# Patient Record
Sex: FEMALE
[De-identification: names, ages, dates, MRNs, and addresses within clinical notes are randomized per-mention and may not be internally consistent; named-entity substitution may affect disease eponyms.]

---

## 2019-08-23 ENCOUNTER — HOSPITAL ENCOUNTER (OUTPATIENT)
Dept: HOSPITAL 88 - ER | Age: 59
Setting detail: OBSERVATION
LOS: 1 days | Discharge: HOME | End: 2019-08-24
Attending: INTERNAL MEDICINE | Admitting: INTERNAL MEDICINE
Payer: MEDICARE

## 2019-08-23 VITALS — WEIGHT: 142 LBS | BODY MASS INDEX: 26.81 KG/M2 | HEIGHT: 61 IN

## 2019-08-23 VITALS — SYSTOLIC BLOOD PRESSURE: 104 MMHG | DIASTOLIC BLOOD PRESSURE: 54 MMHG

## 2019-08-23 VITALS — SYSTOLIC BLOOD PRESSURE: 101 MMHG | DIASTOLIC BLOOD PRESSURE: 52 MMHG

## 2019-08-23 VITALS — DIASTOLIC BLOOD PRESSURE: 52 MMHG | SYSTOLIC BLOOD PRESSURE: 101 MMHG

## 2019-08-23 DIAGNOSIS — D50.0: Primary | ICD-10-CM

## 2019-08-23 DIAGNOSIS — E78.5: ICD-10-CM

## 2019-08-23 DIAGNOSIS — E88.09: ICD-10-CM

## 2019-08-23 DIAGNOSIS — Z79.84: ICD-10-CM

## 2019-08-23 DIAGNOSIS — Z90.13: ICD-10-CM

## 2019-08-23 DIAGNOSIS — E11.9: ICD-10-CM

## 2019-08-23 DIAGNOSIS — Z83.3: ICD-10-CM

## 2019-08-23 DIAGNOSIS — R53.1: ICD-10-CM

## 2019-08-23 DIAGNOSIS — Z85.05: ICD-10-CM

## 2019-08-23 DIAGNOSIS — Z85.3: ICD-10-CM

## 2019-08-23 DIAGNOSIS — E77.8: ICD-10-CM

## 2019-08-23 DIAGNOSIS — N28.9: ICD-10-CM

## 2019-08-23 DIAGNOSIS — K21.9: ICD-10-CM

## 2019-08-23 DIAGNOSIS — Z90.49: ICD-10-CM

## 2019-08-23 DIAGNOSIS — Z82.49: ICD-10-CM

## 2019-08-23 DIAGNOSIS — F32.9: ICD-10-CM

## 2019-08-23 DIAGNOSIS — R77.1: ICD-10-CM

## 2019-08-23 LAB
ALBUMIN SERPL-MCNC: 3.1 G/DL (ref 3.5–5)
ALBUMIN/GLOB SERPL: 1.5 {RATIO} (ref 0.8–2)
ALP SERPL-CCNC: 24 IU/L (ref 40–150)
ALT SERPL-CCNC: 6 IU/L (ref 0–55)
ANION GAP SERPL CALC-SCNC: 9.9 MMOL/L (ref 8–16)
BASOPHILS # BLD AUTO: 0 10*3/UL (ref 0–0.1)
BASOPHILS NFR BLD AUTO: 0.2 % (ref 0–1)
BUN SERPL-MCNC: 8 MG/DL (ref 7–26)
BUN/CREAT SERPL: 8 (ref 6–25)
CALCIUM SERPL-MCNC: 9.1 MG/DL (ref 8.4–10.2)
CHLORIDE SERPL-SCNC: 109 MMOL/L (ref 98–107)
CK MB SERPL-MCNC: 0.9 NG/ML (ref 0–5)
CK SERPL-CCNC: 34 IU/L (ref 29–168)
CO2 SERPL-SCNC: 22 MMOL/L (ref 22–29)
DEPRECATED APTT PLAS QN: 28.8 SECONDS (ref 23.8–35.5)
DEPRECATED INR PLAS: 1.11
DEPRECATED NEUTROPHILS # BLD AUTO: 4.3 10*3/UL (ref 2.1–6.9)
EGFRCR SERPLBLD CKD-EPI 2021: 53 ML/MIN (ref 60–?)
EOSINOPHIL # BLD AUTO: 0.1 10*3/UL (ref 0–0.4)
EOSINOPHIL NFR BLD AUTO: 1.3 % (ref 0–6)
ERYTHROCYTE [DISTWIDTH] IN CORD BLOOD: 18.4 % (ref 11.7–14.4)
GLOBULIN PLAS-MCNC: 2.1 G/DL (ref 2.3–3.5)
GLUCOSE SERPLBLD-MCNC: 100 MG/DL (ref 74–118)
HCT VFR BLD AUTO: 17.2 % (ref 34.2–44.1)
HGB BLD-MCNC: 4.8 G/DL (ref 12–16)
LYMPHOCYTES # BLD: 0.6 10*3/UL (ref 1–3.2)
LYMPHOCYTES NFR BLD AUTO: 11.2 % (ref 18–39.1)
MCH RBC QN AUTO: 28.7 PG (ref 28–32)
MCHC RBC AUTO-ENTMCNC: 27.9 G/DL (ref 31–35)
MCV RBC AUTO: 103 FL (ref 81–99)
MONOCYTES # BLD AUTO: 0.4 10*3/UL (ref 0.2–0.8)
MONOCYTES NFR BLD AUTO: 7.3 % (ref 4.4–11.3)
NEUTS SEG NFR BLD AUTO: 79.4 % (ref 38.7–80)
PLATELET # BLD AUTO: 331 X10E3/UL (ref 140–360)
POTASSIUM SERPL-SCNC: 3.9 MMOL/L (ref 3.5–5.1)
PROTHROMBIN TIME: 14.8 SECONDS (ref 11.9–14.5)
RBC # BLD AUTO: 1.67 X10E6/UL (ref 3.6–5.1)
SODIUM SERPL-SCNC: 137 MMOL/L (ref 136–145)

## 2019-08-23 PROCEDURE — 82550 ASSAY OF CK (CPK): CPT

## 2019-08-23 PROCEDURE — 80053 COMPREHEN METABOLIC PANEL: CPT

## 2019-08-23 PROCEDURE — 36415 COLL VENOUS BLD VENIPUNCTURE: CPT

## 2019-08-23 PROCEDURE — 86900 BLOOD TYPING SEROLOGIC ABO: CPT

## 2019-08-23 PROCEDURE — 84484 ASSAY OF TROPONIN QUANT: CPT

## 2019-08-23 PROCEDURE — 82553 CREATINE MB FRACTION: CPT

## 2019-08-23 PROCEDURE — 99284 EMERGENCY DEPT VISIT MOD MDM: CPT

## 2019-08-23 PROCEDURE — 85025 COMPLETE CBC W/AUTO DIFF WBC: CPT

## 2019-08-23 PROCEDURE — 36430 TRANSFUSION BLD/BLD COMPNT: CPT

## 2019-08-23 PROCEDURE — 82728 ASSAY OF FERRITIN: CPT

## 2019-08-23 PROCEDURE — 85045 AUTOMATED RETICULOCYTE COUNT: CPT

## 2019-08-23 PROCEDURE — 85610 PROTHROMBIN TIME: CPT

## 2019-08-23 PROCEDURE — 82607 VITAMIN B-12: CPT

## 2019-08-23 PROCEDURE — 86920 COMPATIBILITY TEST SPIN: CPT

## 2019-08-23 PROCEDURE — 85730 THROMBOPLASTIN TIME PARTIAL: CPT

## 2019-08-23 PROCEDURE — 82948 REAGENT STRIP/BLOOD GLUCOSE: CPT

## 2019-08-23 PROCEDURE — 86850 RBC ANTIBODY SCREEN: CPT

## 2019-08-23 PROCEDURE — 82784 ASSAY IGA/IGD/IGG/IGM EACH: CPT

## 2019-08-23 RX ADMIN — INSULIN HUMAN SCH UNIT: 100 INJECTION, SOLUTION PARENTERAL at 21:00

## 2019-08-23 RX ADMIN — FUROSEMIDE PRN MG: 10 INJECTION, SOLUTION INTRAMUSCULAR; INTRAVENOUS at 23:14

## 2019-08-23 NOTE — NUR
received patient aaox3, poor historian, receiving blood transfusion, tolerating well. no 
sob. denies pain. bed locked and in lowest position, call light within reach. bed alarm on.

## 2019-08-23 NOTE — XMS REPORT
Patient Summary Document

                             Created on: 2019



BETTYE VALLADARES

External Reference #: [id number]

: 1960

Sex: Female



Demographics







                          Address                   [address]

[place]  [address]

 

                          Home Phone                [phone]

 

                          Preferred Language        Unknown

 

                          Marital Status            Unknown

 

                          Taoism Affiliation     Unknown

 

                          Race                      Unknown

 

                          Ethnic Group              Unknown





Author







                          Author                    [facility]

 

                          [facility]

 

                          Address                   Unknown

 

                          Phone                     Unavailable







Support







                Name            Relationship    Address         Phone

 

                    GUADALUPE KELLER      PRS                 [id number]

[place]  [address]                      [phone]

 

                    AGUIRREMAGNUS RED    PRS                 [id number]

[place]  [address]                      [phone]







Care Team Providers







                    Care Team Member Name    Role                Phone

 

                          Unavailable               Unavailable







Payers







             Payer Name    Policy Type    Policy Number    Effective Date    Expiration Date







Problems

This patient has no known problems.



Allergies, Adverse Reactions, Alerts







          Allergy Name    Allergy Type    Status    Severity    Reaction(s)    Onset Date    Inactive 

Date                      Treating Clinician        Comments

 

        No Known Allergies    DA      Active    U               2019 00:00:00                     

 

        No Known Allergies    DA      Active    U               2017 00:00:00                     







Medications

This patient has no known medications.



Results







           Test Description    Test Time    Test Comments    Text Results    Atomic Results    Result

 Comments

 

                ELECTROLYTES PROFILE    2019 16:18:00                      

 

   

 

                SODIUM (test code=NA)    138 mmol/L      136-145          

 

                POTASSIUM (test code=K)    3.9 mmol/L      3.5-5.1          

 

                CHLORIDE (test code=CL)    106.0 mmol/L               

 

                CARBON DIOXIDE (test code=CO2)    26.0 mmol/L     21-32            

 

                ANION GAP (test code=GAP)    9.9             10-20            





CBC W/AUTO [id number] 16:12:00* 





                Test Item       Value           Reference Range    Comments

 

                WHITE BLOOD CELL (test code=WBC)    5.5 K/mm3       4.5-12.5         

 

                RED BLOOD CELL (test code=RBC)    4.16 mill/mm3    3.7-5.2          

 

                HEMOGLOBIN (test code=HGB)    11.1 gram/dL    11.5-15.5        

 

                HEMATOCRIT (test code=HCT)    36.3 %          36.0-46.0        

 

                MEAN CELL VOLUME (test code=MCV)    87.3 fL         80-98            

 

                MEAN CELL HGB (test code=MCH)    26.7 picogram    27.0-33.0        

 

                MEAN CELL HGB CONCETRATION (test code=MCHC)    30.6 gram/dL    33.0-36.0        

 

                RED CELL DISTRIBUTION WIDTH (test code=RDW)    14.5 %          11.6-16.2        

 

                RED CELL DISTRIBUTION WIDTH SD (test code=RDW-SD)    46.5 fL         37.0-51.0        

 

                PLATELET COUNT (test code=PLT)    290 K/mm3       150-450          

 

                MEAN PLATELET VOLUME (test code=MPV)    10.8 fL         6.7-11.0         

 

                NEUTROPHIL % (test code=NT%)    68.2 %          39.0-69.0        

 

                IMMATURE GRANULOCYTE % (test code=IG%)    0.4 %           0.0-5.0          

 

                LYMPHOCYTE % (test code=LY%)    19.4 %          25.0-55.0        

 

                MONOCYTE % (test code=MO%)    7.5 %           0.0-10.0         

 

                EOSINOPHIL % (test code=EO%)    4.0 %           0.0-5.0          

 

                BASOPHIL % (test code=BA%)    0.5 %           0.0-1.0          

 

                NUCLEATED RBC % (test code=NRBC%)    0.0 %           0-0              

 

                NEUTROPHIL # (test code=NT#)    3.72 K/mm3      1.8-7.7          

 

                IMMATURE GRANULOCYTE # (test code=IG#)    0.02 x10 3/uL    0-0.03           

 

                LYMPHOCYTE # (test code=LY#)    1.06 K/mm3      1.0-5.0          

 

                MONOCYTE # (test code=MO#)    0.41 K/mm3      0-0.8            

 

                EOSINOPHIL # (test code=EO#)    0.22 K/mm3      0.0-0.5          

 

                BASOPHIL # (test code=BA#)    0.03 K/mm3      0.0-0.2          

 

                NUCLEATED RBC # (test code=NRBC#)    0.00 K/mm3      0.0-0.1

## 2019-08-23 NOTE — NUR
Dr. Rose rounded. Verbal orders received to give only 2 units of blood. lab orders 
entered. spoke with  stated immunoglobin labs are send outs, will draw in AM. 
questioned lab about cancelling 2 units of blood per MD orders, stated its an open order 
now, just  2nd unit. placed notify by md order to only give 2 units of blood.

## 2019-08-24 VITALS — DIASTOLIC BLOOD PRESSURE: 57 MMHG | SYSTOLIC BLOOD PRESSURE: 109 MMHG

## 2019-08-24 VITALS — SYSTOLIC BLOOD PRESSURE: 103 MMHG | DIASTOLIC BLOOD PRESSURE: 56 MMHG

## 2019-08-24 VITALS — SYSTOLIC BLOOD PRESSURE: 101 MMHG | DIASTOLIC BLOOD PRESSURE: 65 MMHG

## 2019-08-24 VITALS — DIASTOLIC BLOOD PRESSURE: 59 MMHG | SYSTOLIC BLOOD PRESSURE: 102 MMHG

## 2019-08-24 VITALS — SYSTOLIC BLOOD PRESSURE: 109 MMHG | DIASTOLIC BLOOD PRESSURE: 60 MMHG

## 2019-08-24 LAB
BASOPHILS # BLD AUTO: 0.1 10*3/UL (ref 0–0.1)
BASOPHILS NFR BLD AUTO: 1.1 % (ref 0–1)
DEPRECATED NEUTROPHILS # BLD AUTO: 3.5 10*3/UL (ref 2.1–6.9)
EOSINOPHIL # BLD AUTO: 0.1 10*3/UL (ref 0–0.4)
EOSINOPHIL NFR BLD AUTO: 2.5 % (ref 0–6)
ERYTHROCYTE [DISTWIDTH] IN CORD BLOOD: 19.4 % (ref 11.7–14.4)
HCT VFR BLD AUTO: 24.5 % (ref 34.2–44.1)
HGB BLD-MCNC: 7.4 G/DL (ref 12–16)
LYMPHOCYTES # BLD: 0.7 10*3/UL (ref 1–3.2)
LYMPHOCYTES NFR BLD AUTO: 14.6 % (ref 18–39.1)
MCH RBC QN AUTO: 28.7 PG (ref 28–32)
MCHC RBC AUTO-ENTMCNC: 30.2 G/DL (ref 31–35)
MCV RBC AUTO: 95 FL (ref 81–99)
MONOCYTES # BLD AUTO: 0.3 10*3/UL (ref 0.2–0.8)
MONOCYTES NFR BLD AUTO: 6.8 % (ref 4.4–11.3)
NEUTS SEG NFR BLD AUTO: 74.6 % (ref 38.7–80)
PLATELET # BLD AUTO: 335 X10E3/UL (ref 140–360)
RBC # BLD AUTO: 2.58 X10E6/UL (ref 3.6–5.1)

## 2019-08-24 RX ADMIN — INSULIN HUMAN SCH UNIT: 100 INJECTION, SOLUTION PARENTERAL at 16:05

## 2019-08-24 RX ADMIN — INSULIN HUMAN SCH UNIT: 100 INJECTION, SOLUTION PARENTERAL at 07:30

## 2019-08-24 RX ADMIN — FUROSEMIDE PRN MG: 10 INJECTION, SOLUTION INTRAMUSCULAR; INTRAVENOUS at 06:24

## 2019-08-24 RX ADMIN — INSULIN HUMAN SCH UNIT: 100 INJECTION, SOLUTION PARENTERAL at 11:30

## 2019-08-24 NOTE — NUR
2ND UNIT OF BLOOD TRANSFUSING. REMAINED WITH PATIENT FOR FIRST 15 MIN. VSS. WILL CONTINUE TO 
MONITOR PATIENT. TOLERATING TRANSFUSION.

## 2019-08-24 NOTE — NUR
Dr Kennedy consulted per telephone order from Dr Bowers. PRBC transfusion started and no 
reaction after 1st 15 min. will monitor closely.

## 2019-08-24 NOTE — NUR
BLOOD TRANSFUSION COMPLETE AT THIS TIME. NO S/S OF ADVERSE REACTION. PATIENT IN STABLE 
CONDITION, VSS. WILL CONTINUE TO MONITOR.

## 2019-08-24 NOTE — HISTORY AND PHYSICAL
Consultation to Dr. Bonilla Bowers.

 

HISTORY OF PRESENT ILLNESS:  Wen Mcclellan is a 59-year-old  female, who is

known to me for approximately 30 years for breast cancer with liver metastases, only one

of those patients, who have survived this long with confirmed from liver metastases. 

 

FAMILY HISTORY:  Noncontributory.

 

FAMILY HISTORY:  Noncontributory.

 

ALLERGIES:  REPORTED NONE.

 

MEDICATIONS:  At the present time:

1. Lasix.

2. Regular insulin. 

The patient had presented with weakness.

 

REVIEW OF SYSTEMS:

HEENT:  Normal. 

CARDIAC:  Normal. 

RESPIRATORY:  Normal. 

GI:  Normal. 

:  Normal. 

MUSCULOSKELETAL:  Normal. 

SKIN:  Bilateral mastectomy with liver metastases. 

BREASTS:  Bilateral mastectomy with liver metastases.

 

PHYSICAL EXAMINATION:

GENERAL:  A remarkable well-built female, very anemic. 

NECK:  No palpable adenopathy. 

HEART:  Within normal limits. 

LUNGS:  Clear. 

BREASTS:  Shows only chest wall, no masses. 

ABDOMEN:  Obese. 

RECTAL:  Deferred. 

VAGINAL:  Deferred. 

CENTRAL NERVOUS SYSTEM:  Essentially normal. 

EXTREMITIES:  Essentially normal.

LABORATORY DATA:  Shows a hemoglobin of 12.8, hematocrit 17.2, MCV high at 103, MCHC

very low at 27.9, RDW high at 18.4, white count of 5450, and platelets are reported at

331,000.  The patient's chemistry shows a sodium of 137, potassium 3.9, chloride 109,

CO2 22, BUN 8, and creatinine 1.06.  Total proteins low at 5.2, albumin low at 3.1,

globulin is low at 2.1, and B12 low at 229.  The ferritin level is not available at this

time for review.  Quantitation of immunoglobulins have been done. 

 

IMPRESSION:  

1. Breast cancer, status post bilateral mastectomy.

2. Liver metastases, in complete remission.

3. Combined deficiency anemia.

4. Hypoproteinemia.

5. Hypoalbuminemia.

6. Hyperglobulinemia.

7. 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

 

PLAN, COMMENTS, AND SUGGESTIONS:  Suggest blood, suggest B12, suggest an EGD and

colonoscopy as part of workup. 

 

The patient's stool for occult blood was suggested, however, the results are not

available at this time.  The patient's retic response is high at 11.7%, however, does

not have antibodies, so this is not hemolysis. 

 

I will confine myself to Hematology. 

 

Thank you very much for allowing me to participate in the management of this patient.

 

 

 

 

______________________________

MD NEVILLE Cano/BECKY

D:  08/24/2019 13:14:44

T:  08/24/2019 14:50:09

Job #:  [id number]

 

cc:            MD Noah Villa MD

## 2019-10-08 ENCOUNTER — HOSPITAL ENCOUNTER (OUTPATIENT)
Dept: HOSPITAL 88 - OR | Age: 59
Discharge: HOME | End: 2019-10-08
Attending: INTERNAL MEDICINE
Payer: MEDICARE

## 2019-10-08 VITALS — DIASTOLIC BLOOD PRESSURE: 85 MMHG | SYSTOLIC BLOOD PRESSURE: 131 MMHG

## 2019-10-08 DIAGNOSIS — Z79.84: ICD-10-CM

## 2019-10-08 DIAGNOSIS — K44.9: ICD-10-CM

## 2019-10-08 DIAGNOSIS — Z85.3: ICD-10-CM

## 2019-10-08 DIAGNOSIS — K21.9: ICD-10-CM

## 2019-10-08 DIAGNOSIS — R63.4: ICD-10-CM

## 2019-10-08 DIAGNOSIS — F32.9: ICD-10-CM

## 2019-10-08 DIAGNOSIS — E78.5: ICD-10-CM

## 2019-10-08 DIAGNOSIS — K29.70: Primary | ICD-10-CM

## 2019-10-08 DIAGNOSIS — D12.3: ICD-10-CM

## 2019-10-08 DIAGNOSIS — D12.0: ICD-10-CM

## 2019-10-08 DIAGNOSIS — E11.9: ICD-10-CM

## 2019-10-08 DIAGNOSIS — K31.7: ICD-10-CM

## 2019-10-08 DIAGNOSIS — F20.9: ICD-10-CM

## 2019-10-08 DIAGNOSIS — D12.4: ICD-10-CM

## 2019-10-08 DIAGNOSIS — D12.2: ICD-10-CM

## 2019-10-08 DIAGNOSIS — D12.9: ICD-10-CM

## 2019-10-08 DIAGNOSIS — Z79.82: ICD-10-CM

## 2019-10-08 DIAGNOSIS — Z80.0: ICD-10-CM

## 2019-10-08 DIAGNOSIS — D64.9: ICD-10-CM

## 2019-10-08 PROCEDURE — 45378 DIAGNOSTIC COLONOSCOPY: CPT

## 2019-10-08 PROCEDURE — 88305 TISSUE EXAM BY PATHOLOGIST: CPT

## 2019-10-08 PROCEDURE — 43239 EGD BIOPSY SINGLE/MULTIPLE: CPT

## 2019-10-08 PROCEDURE — 45385 COLONOSCOPY W/LESION REMOVAL: CPT

## 2019-10-08 PROCEDURE — 45384 COLONOSCOPY W/LESION REMOVAL: CPT

## 2019-10-08 PROCEDURE — 93005 ELECTROCARDIOGRAM TRACING: CPT

## 2019-10-08 PROCEDURE — 82948 REAGENT STRIP/BLOOD GLUCOSE: CPT

## 2019-10-08 PROCEDURE — 88312 SPECIAL STAINS GROUP 1: CPT

## 2019-10-08 PROCEDURE — 36415 COLL VENOUS BLD VENIPUNCTURE: CPT

## 2019-10-08 PROCEDURE — 43255 EGD CONTROL BLEEDING ANY: CPT

## 2019-10-08 PROCEDURE — 43236 UPPR GI SCOPE W/SUBMUC INJ: CPT

## 2019-10-08 PROCEDURE — 43251 EGD REMOVE LESION SNARE: CPT

## 2019-12-03 ENCOUNTER — HOSPITAL ENCOUNTER (OUTPATIENT)
Dept: HOSPITAL 88 - OR | Age: 59
Discharge: HOME | End: 2019-12-03
Attending: INTERNAL MEDICINE
Payer: MEDICARE

## 2019-12-03 VITALS — SYSTOLIC BLOOD PRESSURE: 155 MMHG | DIASTOLIC BLOOD PRESSURE: 88 MMHG

## 2019-12-03 DIAGNOSIS — K44.9: ICD-10-CM

## 2019-12-03 DIAGNOSIS — Z79.84: ICD-10-CM

## 2019-12-03 DIAGNOSIS — Z80.0: ICD-10-CM

## 2019-12-03 DIAGNOSIS — F20.9: ICD-10-CM

## 2019-12-03 DIAGNOSIS — K29.70: ICD-10-CM

## 2019-12-03 DIAGNOSIS — K31.7: Primary | ICD-10-CM

## 2019-12-03 DIAGNOSIS — D50.9: ICD-10-CM

## 2019-12-03 DIAGNOSIS — E11.9: ICD-10-CM

## 2019-12-03 DIAGNOSIS — F30.9: ICD-10-CM

## 2019-12-03 DIAGNOSIS — Z85.3: ICD-10-CM

## 2019-12-03 DIAGNOSIS — Z79.82: ICD-10-CM

## 2019-12-03 DIAGNOSIS — E78.5: ICD-10-CM

## 2019-12-03 DIAGNOSIS — R63.4: ICD-10-CM

## 2019-12-03 PROCEDURE — 82948 REAGENT STRIP/BLOOD GLUCOSE: CPT

## 2019-12-03 PROCEDURE — 43251 EGD REMOVE LESION SNARE: CPT

## 2019-12-03 PROCEDURE — 43236 UPPR GI SCOPE W/SUBMUC INJ: CPT

## 2019-12-03 PROCEDURE — 43255 EGD CONTROL BLEEDING ANY: CPT

## 2019-12-03 PROCEDURE — 88305 TISSUE EXAM BY PATHOLOGIST: CPT

## 2019-12-03 PROCEDURE — 36415 COLL VENOUS BLD VENIPUNCTURE: CPT

## 2019-12-03 PROCEDURE — 43235 EGD DIAGNOSTIC BRUSH WASH: CPT

## 2019-12-03 PROCEDURE — 88312 SPECIAL STAINS GROUP 1: CPT

## 2020-03-10 ENCOUNTER — HOSPITAL ENCOUNTER (OUTPATIENT)
Dept: HOSPITAL 88 - OR | Age: 60
Discharge: HOME | End: 2020-03-10
Attending: INTERNAL MEDICINE
Payer: MEDICARE

## 2020-03-10 VITALS — DIASTOLIC BLOOD PRESSURE: 76 MMHG | SYSTOLIC BLOOD PRESSURE: 121 MMHG

## 2020-03-10 DIAGNOSIS — F20.9: ICD-10-CM

## 2020-03-10 DIAGNOSIS — Z80.0: ICD-10-CM

## 2020-03-10 DIAGNOSIS — K57.30: ICD-10-CM

## 2020-03-10 DIAGNOSIS — F31.9: ICD-10-CM

## 2020-03-10 DIAGNOSIS — Z79.84: ICD-10-CM

## 2020-03-10 DIAGNOSIS — D50.9: ICD-10-CM

## 2020-03-10 DIAGNOSIS — D12.3: ICD-10-CM

## 2020-03-10 DIAGNOSIS — E11.9: ICD-10-CM

## 2020-03-10 DIAGNOSIS — K21.9: ICD-10-CM

## 2020-03-10 DIAGNOSIS — K44.9: ICD-10-CM

## 2020-03-10 DIAGNOSIS — Z01.810: ICD-10-CM

## 2020-03-10 DIAGNOSIS — K63.9: ICD-10-CM

## 2020-03-10 DIAGNOSIS — D12.4: ICD-10-CM

## 2020-03-10 DIAGNOSIS — K29.70: ICD-10-CM

## 2020-03-10 DIAGNOSIS — Z85.3: ICD-10-CM

## 2020-03-10 DIAGNOSIS — Z79.82: ICD-10-CM

## 2020-03-10 DIAGNOSIS — K64.8: ICD-10-CM

## 2020-03-10 DIAGNOSIS — K31.7: Primary | ICD-10-CM

## 2020-03-10 PROCEDURE — 93005 ELECTROCARDIOGRAM TRACING: CPT

## 2020-03-10 PROCEDURE — 43239 EGD BIOPSY SINGLE/MULTIPLE: CPT

## 2020-03-10 PROCEDURE — 45385 COLONOSCOPY W/LESION REMOVAL: CPT

## 2020-03-10 PROCEDURE — 45380 COLONOSCOPY AND BIOPSY: CPT

## 2020-03-10 PROCEDURE — 88305 TISSUE EXAM BY PATHOLOGIST: CPT

## 2020-03-10 PROCEDURE — 82948 REAGENT STRIP/BLOOD GLUCOSE: CPT

## 2020-03-10 PROCEDURE — 36415 COLL VENOUS BLD VENIPUNCTURE: CPT

## 2020-03-10 PROCEDURE — 45378 DIAGNOSTIC COLONOSCOPY: CPT

## 2020-03-10 PROCEDURE — 43251 EGD REMOVE LESION SNARE: CPT

## 2020-03-10 PROCEDURE — 88312 SPECIAL STAINS GROUP 1: CPT
